# Patient Record
Sex: FEMALE | Race: WHITE | URBAN - METROPOLITAN AREA
[De-identification: names, ages, dates, MRNs, and addresses within clinical notes are randomized per-mention and may not be internally consistent; named-entity substitution may affect disease eponyms.]

---

## 2018-08-06 ENCOUNTER — HISTORICAL (OUTPATIENT)
Dept: ADMINISTRATIVE | Facility: HOSPITAL | Age: 83
End: 2018-08-06

## 2018-08-20 ENCOUNTER — HISTORICAL (OUTPATIENT)
Dept: ADMINISTRATIVE | Facility: HOSPITAL | Age: 83
End: 2018-08-20

## 2018-09-19 ENCOUNTER — HISTORICAL (OUTPATIENT)
Dept: ADMINISTRATIVE | Facility: HOSPITAL | Age: 83
End: 2018-09-19

## 2019-03-21 ENCOUNTER — HISTORICAL (OUTPATIENT)
Dept: ADMINISTRATIVE | Facility: HOSPITAL | Age: 84
End: 2019-03-21

## 2019-06-11 ENCOUNTER — HISTORICAL (OUTPATIENT)
Dept: ADMINISTRATIVE | Facility: HOSPITAL | Age: 84
End: 2019-06-11

## 2019-06-27 ENCOUNTER — HISTORICAL (OUTPATIENT)
Dept: ADMINISTRATIVE | Facility: HOSPITAL | Age: 84
End: 2019-06-27

## 2019-07-23 ENCOUNTER — HISTORICAL (OUTPATIENT)
Dept: ADMINISTRATIVE | Facility: HOSPITAL | Age: 84
End: 2019-07-23
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
## 2022-05-02 NOTE — HISTORICAL [facility]
This is a historical note converted from [employer]. Formatting and pictures may have been removed.  Please reference [employer] for original formatting and attached multimedia. Chief Complaint  6 WEEK FOLLOW UP RIGHT TIBIA PLATEAU FX, NON-OP, IN HKB.  History of Present Illness  Patient is?6 weeks?s/p Right medial tibial plateau fracture treated as non-op. Pt is doing well overall.  Review of Systems  Constitutional: negative except as stated in HPI  Eye: negative except as stated in HPI  ENMT: negative except as stated in HPI  Respiratory: negative except as stated in HPI  Cardiovascular: negative except as stated in HPI  Gastrointestinal: negative except as stated in HPI  Genitourinary: negative except as stated in HPI  Hema/Lymph: negative except as stated in HPI  Endocrine: negative except as stated in HPI  Immunologic: negative except as stated in HPI  Musculoskeletal: negative except as stated in HPI  Integumentary: negative except as stated in HPI  Neurologic: negative except as stated in HPI  ?   All Other ROS_ ?negative except as stated in HPI  Physical Exam  Vitals & Measurements  HR:?80(Peripheral)? RR:?20? BP:?132/76?  HT:?162?cm? HT:?162?cm? WT:?76?kg? WT:?76?kg? BMI:?28.96?  General-Alert, oriented, no fever, chills  HEENT-Normocephalic, EOMI, moist oral mucosa, neck supple and nontender  Respiratory-Nonlabored respirations, symmetric chest expansion, chest wall nontender  Cardiac-Regular rate and rhythm, Pulses palpable in all extremities, Normal peripheral perfusion  Gastrointestinal-Soft, nontender, nondistended  Hemo/Lymph-No lymphadenopathy  Musculoskeletal-RLE-mild swelling to right knee. +TA/GS, EHL/FHL intact. SILT distally.  ?Neurologic-Alert and oriented x4, cooperative  ?Dermatologic-Skin warm, pink, dry.  ?  Assessment/Plan  Displaced fracture of medial condyle of right tibia, initial encounter for closed fracture  Ordered:  Clinic Follow up, *Est. 09/20/18 3:00:00 CDT, Order for future visit,  Displaced fracture of medial condyle of right tibia, initial encounter for closed fracture, [facility]  Post-Op follow-up visit [id number] PC, Displaced fracture of medial condyle of right tibia, initial encounter for closed fracture, [facility], 08/20/18 9:53:00 CDT  PT/OT External Referral, 08/20/18 9:53:00 CDT, Displaced fracture of medial condyle of right tibia, initial encounter for closed fracture, Evaluate and Treat, 3 X Week, NWB RLE. Gental ROM right knee 0-90 degrees. All modalaties welcomed.  ?  ?  ?  COntinue NWB RLE. Continue current treatment with hinged knee locked in extension; ok to come out of knee brace several times a day for ROM exercises and hygiene. Work on bending knee several times a day. PT orders for gentle ROM right knee provided. RTC in 1 month for X-rays.   Problem List/Past Medical History  Ongoing  Anxiety disorder  Hyperlipidaemia  Hypertension  Hypothyroidism  Historical  No qualifying data  Medications  cyclobenzaprine 10 mg oral tablet, 10 mg= 1 tab(s), Oral, Daily  Flonase 50 mcg/inh nasal spray, 1 spray(s), Nasal, BID  ibuprofen 400 mg oral tablet, 400 mg= 1 tab(s), Oral, q4hr, PRN  Lexapro 20 mg oral tablet, 20 mg= 1 tab(s), Oral, Daily  metoprolol tartrate 50 mg oral tab, 50 mg= 1 tab(s), Oral, BID  Pravastatin 80 mg Oral Tab, 80 mg= 1 tab(s), Oral, Daily  Synthroid 100 mcg (0.1 mg) oral tablet, 100 mcg= 1 tab(s), Oral, Daily  traZODone, 25 mg, Oral  Allergies  codeine?(RASH)  Social History  Tobacco  Never smoker Use:., 08/06/2018  Family History  Family history is negative  Health Maintenance  Health Maintenance  ???Pending?(in the next year)  ??? ??Due?  ??? ? ? ?Bone Density Screening due??08/20/18??Variable frequency  ??? ? ? ?Fall Risk Assessment due??08/20/18??and every 1??year(s)  ??? ? ? ?Functional Assessment due??08/20/18??and every 1??year(s)  ??? ? ? ?Hypertension Management-Education due??08/20/18??and every 1??year(s)  ??? ? ? ?Hypertension  Management-BMP due??08/20/18??and every?  ??? ? ? ?Pneumococcal Vaccine due??08/20/18??and every 100??year(s)  ??? ? ? ?Pneumococcal Vaccine due??08/20/18??and every?  ??? ? ? ?Smoking Cessation due??08/20/18??and every 1??year(s)  ??? ? ? ?Tetanus Vaccine due??08/20/18??and every 10??year(s)  ??? ? ? ?Zoster Vaccine due??08/20/18??and every 100??year(s)  ??? ??Due In Future?  ??? ? ? ?Hypertension Management-Blood Pressure not due until??08/06/19??and every 1??year(s)  ???Satisfied?(in the past 1 year)  ??? ??Satisfied?  ??? ? ? ?Blood Pressure Screening on??08/20/18.??Satisfied by Marychuy Alves  ??? ? ? ?Body Mass Index Check on??08/20/18.??Satisfied by Marychuy Alves  ??? ? ? ?Depression Screening on??08/20/18.??Satisfied by Marychuy Alves  ??? ? ? ?Hypertension Management-Blood Pressure on??08/20/18.??Satisfied by Marychuy Alves  ??? ? ? ?Obesity Screening on??08/20/18.??Satisfied by Marychuy Alves  ?  ?  Diagnostic Results  X-ray right knee shows evidence of healing fracture, alignment unchanged from previous.      Patient seen and examined  Agree with above

## 2022-05-02 NOTE — HISTORICAL [facility]
This is a historical note converted from [employer]. Formatting and pictures may have been removed.  Please reference [employer] for original formatting and attached multimedia. Chief Complaint  10.5 week follow up right tibial plateau fx, non-op. In wheelchair, no complaints  History of Present Illness  Patient is?10.5 weeks?s/p Right medial tibial plateau fracture treated as non-op. Here today for x-ray and evaluation.?Pt is doing well overall.  Review of Systems  Constitutional: negative except as stated in HPI  Eye: negative except as stated in HPI  ENMT: negative except as stated in HPI  Respiratory: negative except as stated in HPI  Cardiovascular: negative except as stated in HPI  Gastrointestinal: negative except as stated in HPI  Genitourinary: negative except as stated in HPI  Hema/Lymph: negative except as stated in HPI  Endocrine: negative except as stated in HPI  Immunologic: negative except as stated in HPI  Musculoskeletal: negative except as stated in HPI  Integumentary: negative except as stated in HPI  Neurologic: negative except as stated in HPI  ?   All Other ROS_ ?negative except as stated in HPI  Physical Exam  Vitals & Measurements  HR:?80(Peripheral)? RR:?20? BP:?132/76?  HT:?162?cm? HT:?162?cm? WT:?76?kg? WT:?76?kg? BMI:?28.96?  General-Alert, oriented, no fever, chills  HEENT-Normocephalic, EOMI, moist oral mucosa, neck supple and nontender  Respiratory-Nonlabored respirations, symmetric chest expansion, chest wall nontender  Cardiac-Regular rate and rhythm, Pulses palpable in all extremities, Normal peripheral perfusion  Gastrointestinal-Soft, nontender, nondistended  Hemo/Lymph-No lymphadenopathy  Musculoskeletal-RLE-mild swelling to right knee. +TA/GS, EHL/FHL intact. SILT distally.  ?Neurologic-Alert and oriented x4, cooperative  ?Dermatologic-Skin warm, pink, dry.  ?  Assessment/Plan  Displaced fracture of medial condyle of right tibia, initial encounter for closed fracture  Ordered:  Clinic  Follow up, *Est. 12/19/18 3:00:00 CST, Order for future visit, Displaced fracture of medial condyle of right tibia, initial encounter for closed fracture, [facility]  Post-Op follow-up visit [id number] PC, Displaced fracture of medial condyle of right tibia, initial encounter for closed fracture, [facility], 09/19/18 9:16:00 CDT  PT/OT External Referral, 09/19/18 9:09:00 CDT, Displaced fracture of medial condyle of right tibia, initial encounter for closed fracture, Evaluate and Treat, 3 X Week, Advance to FWBAT with walker RLE. Strengthening, stretching, and gait training RLE. All modalaties welcomed.  ?  ?  ?  Patient doing well. Advance to FWBAT RLE with walker. No restrictions. PT order provided for ROM, strengthening, stretching and gait training. Explained to patient the next step in her care will be a knee replacement. OTC ibuprofen or Tylenol as needed for pain. RTC in 3 months.   Problem List/Past Medical History  Ongoing  Anxiety disorder  Hyperlipidaemia  Hypertension  Hypothyroidism  Historical  No qualifying data  Medications  cyclobenzaprine 10 mg oral tablet, 10 mg= 1 tab(s), Oral, Daily  Flonase 50 mcg/inh nasal spray, 1 spray(s), Nasal, BID  ibuprofen 400 mg oral tablet, 400 mg= 1 tab(s), Oral, q4hr, PRN  Lexapro 20 mg oral tablet, 20 mg= 1 tab(s), Oral, Daily  metoprolol tartrate 50 mg oral tab, 50 mg= 1 tab(s), Oral, BID  Pravastatin 80 mg Oral Tab, 80 mg= 1 tab(s), Oral, Daily  Synthroid 100 mcg (0.1 mg) oral tablet, 100 mcg= 1 tab(s), Oral, Daily  traZODone, 25 mg, Oral  Allergies  codeine?(RASH)  Social History  Tobacco  Never smoker Use:., 08/06/2018  Family History  Family history is negative  Health Maintenance  Health Maintenance  ???Pending?(in the next year)  ??? ??Due?  ??? ? ? ?ADL Screening due??09/19/18??and every 1??year(s)  ??? ? ? ?Advance Directive due??09/19/18??and every 1??year(s)  ??? ? ? ?Bone Density Screening due??09/19/18??Variable frequency  ??? ? ?  ?Cognitive Screening due??09/19/18??and every 1??year(s)  ??? ? ? ?Fall Risk Assessment due??09/19/18??and every 1??year(s)  ??? ? ? ?Functional Assessment due??09/19/18??and every 1??year(s)  ??? ? ? ?Geriatric Depression Screening due??09/19/18??and every 1??year(s)  ??? ? ? ?Hypertension Management-Education due??09/19/18??and every 1??year(s)  ??? ? ? ?Hypertension Management-BMP due??09/19/18??and every?  ??? ? ? ?Pneumococcal Vaccine due??09/19/18??Variable frequency  ??? ? ? ?Pneumococcal Vaccine due??09/19/18??and every?  ??? ? ? ?Smoking Cessation due??09/19/18??Variable frequency  ??? ? ? ?Tetanus Vaccine due??09/19/18??and every 10??year(s)  ??? ? ? ?Zoster Vaccine due??09/19/18??and every 100??year(s)  ???Satisfied?(in the past 1 year)  ??? ??Satisfied?  ??? ? ? ?Blood Pressure Screening on??09/19/18.??Satisfied by Marychuy Alves  ??? ? ? ?Body Mass Index Check on??09/19/18.??Satisfied by Marychuy Alves  ??? ? ? ?Depression Screening on??09/19/18.??Satisfied by Marychuy Alves  ??? ? ? ?Hypertension Management-Blood Pressure on??09/19/18.??Satisfied by Marychuy Alves  ??? ? ? ?Obesity Screening on??09/19/18.??Satisfied by Marychuy Alves  ?  ?  Diagnostic Results  X-ray right knee shows evidence of healing fracture, alignment unchanged from previous.      Patient seen and examined  Agree with above

## 2022-05-02 NOTE — HISTORICAL [facility]
This is a historical note converted from Be[employer]. Formatting and pictures may have been removed.  Please reference Berenice for original formatting and attached multimedia. Chief Complaint  2 MONTH F/U RIGHT KNEPAIN.  History of Present Illness  86-year-old female returns in follow-up of right knee osteoarthritis. ?Patient got excellent relief of her recent injection. ?She is happy with her relief from the right?knee. ?Her main complaint is actually her left hand today. ?Notes triggering to her left ring finger as well as pain in her palm. ?Worse with flexion. ?Bothers her at night. ?Denies radiations.  Review of Systems  Denies fevers, chills, chest pain, shortness of breath. Comprehensive review of systems performed and otherwise negative except as noted in HPI.  Physical Exam  Vitals & Measurements  BP:?130/78?  HT:?162?cm? WT:?76?kg? BMI:?28.96?  General: No acute distress, alert and oriented, healthy appearing?  HEENT: Head is atraumatic, mucous membranes are moist  Neck: Supples, no JVD  Cardiovascular: Palpable dorsalis pedis and posterior tibial pulses, regular rate and rhythm to those pulses  Lungs: Breathing non-labored  Skin: no rashes appreciated  Neurologic: Can flex and extend knees, ankles, and toes. Sensation is grossly intact  ?  Focused Orthopedic Exam:?  Right knee without wound or skin breakdown.  Mild 1+ joint effusion  tenderness to palpation about the medial joint line  ROM from 5 extension to 110 flexion  stable to varus/valgus. stable to anterior/posterior drawer  Mildly positive McMurrays  Positive patellofemoral crepitus to ROM  ?   Left hand:  Tenderness palpation A1 pulley of the left ring finger.? Mild catching. ?No active triggering however.  ?  Assessment/Plan  1.?Primary osteoarthritis of right knee?M17.11  ?Patients right knee osteoarthritis. ?She has responded well to injection in the past. ?She like to hold off any further intervention. ?She is due for another injection?at any point  in the near future.? She will contact us if she wishes a knee injection in the future.  Ordered:  Clinic Follow up, *Est. 05/21/19 3:00:00 CDT, Order for future visit, Primary osteoarthritis of right knee  Trigger finger, left ring finger, Orthopaedics  Office/Outpatient Visit Level 3 Established [id number] , Primary osteoarthritis of right knee  Trigger finger, left ring finger, [facility], 03/21/19 9:05:00 CDT  ?  2.?Trigger finger, left ring finger?M65.342  ?Patient?with?left ring finger trigger finger.? We will give her an injection to try to calm this down.  ?  After verbal consent obtained the patients left?ring finger was prepped over the A1 pulley and injected with 1 cc of 1% lidocaine and 6 mg betamethasone  Ordered:  betamethasone, 6 mg, Intra-Articular, Once, first dose 03/21/19 10:00:00 CDT, stop date 03/21/19 10:00:00 CDT  Lidocaine inj., 1 mL, Intra-Articular, Once, first dose 03/21/19 9:05:00 CDT, stop date 03/21/19 9:05:00 CDT  asp/inj small joint/bursa 01920 , 03/21/19 9:05:00 CDT, [facility], Routine, 03/21/19 9:05:00 CDT  Clinic Follow up, *Est. 05/21/19 3:00:00 CDT, Order for future visit, Primary osteoarthritis of right knee  Trigger finger, left ring finger, Orthopaedics  Office/Outpatient Visit Level 3 Established [id number] , Primary osteoarthritis of right knee  Trigger finger, left ring finger, [facility], 03/21/19 9:05:00 CDT  ?   Problem List/Past Medical History  Ongoing  Anxiety disorder  Hyperlipidaemia  Hypertension  Hypothyroidism  Historical  No qualifying data  Procedure/Surgical History  Appendectomy  BILATERAL HIP FRACTURES  Cholecystectomy  Hysterectomy   Medications  alendronate 70 mg oral tablet  celecoxib 200 mg oral capsule  Celestone, 6 mg, Intra-Articular, Once  cyclobenzaprine 10 mg oral tablet, 10 mg= 1 tab(s), Oral, Daily,? ?Unable to obtain: Last Dose Date/Time Unknown  Flonase 50 mcg/inh nasal spray, 1 spray(s), Nasal,  BID  ibuprofen 400 mg oral tablet, 400 mg= 1 tab(s), Oral, q4hr, PRN  Lexapro 20 mg oral tablet, 20 mg= 1 tab(s), Oral, Daily  lidocaine 2% injectable solution, 1 mL, Intra-Articular, Once  metoprolol tartrate 50 mg oral tab, 50 mg= 1 tab(s), Oral, BID  Naprosyn 500 mg oral tablet, 500 mg= 1 tab(s), Oral, BID, PRN  Pravastatin 80 mg Oral Tab, 80 mg= 1 tab(s), Oral, Daily  Synthroid 100 mcg (0.1 mg) oral tablet, 100 mcg= 1 tab(s), Oral, Daily  traZODone, 25 mg, Oral,? ?Not Taking, Completed Rx: SHE RAN OUT Last Dose Date/Time Unknown  Allergies  codeine?(RASH)  Social History  Alcohol  Never, 12/11/2018  Home/Environment  Lives with Alone., 12/11/2018  Tobacco  Former smoker, quit more than 30 days ago, N/A, 03/21/2019  Never (less than 100 in lifetime), No, 01/22/2019  Former smoker, No, 12/11/2018  Never smoker Use:., 08/06/2018  Family History  Family history is negative  Health Maintenance  Health Maintenance  ???Pending?(in the next year)  ??? ??Due?  ??? ? ? ?ADL Screening due??03/21/19??and every 1??year(s)  ??? ? ? ?Advance Directive due??03/21/19??and every 1??year(s)  ??? ? ? ?Bone Density Screening due??03/21/19??Variable frequency  ??? ? ? ?Cognitive Screening due??03/21/19??and every 1??year(s)  ??? ? ? ?Fall Risk Assessment due??03/21/19??and every 1??year(s)  ??? ? ? ?Functional Assessment due??03/21/19??and every 1??year(s)  ??? ? ? ?Geriatric Depression Screening due??03/21/19??and every 1??year(s)  ??? ? ? ?Hypertension Management-Education due??03/21/19??and every 1??year(s)  ??? ? ? ?Hypertension Management-BMP due??03/21/19??and every?  ??? ? ? ?Pneumococcal Vaccine due??03/21/19??Variable frequency  ??? ? ? ?Pneumococcal Vaccine due??03/21/19??and every?  ??? ? ? ?Smoking Cessation due??03/21/19??Variable frequency  ??? ? ? ?Tetanus Vaccine due??03/21/19??and every 10??year(s)  ??? ? ? ?Zoster Vaccine due??03/21/19??and every 100??year(s)  ??? ??Due In Future?  ??? ? ? ?Hypertension  Management-Blood Pressure not due until??01/22/20??and every 1??year(s)  ???Satisfied?(in the past 1 year)  ??? ??Satisfied?  ??? ? ? ?Blood Pressure Screening on??03/21/19.??Satisfied by Macy Hurtado  ??? ? ? ?Body Mass Index Check on??03/21/19.??Satisfied by Macy Hurtado  ??? ? ? ?Depression Screening on??12/11/18.??Satisfied by Macy Hurtado  ??? ? ? ?Hypertension Management-Blood Pressure on??03/21/19.??Satisfied by Macy Hurtado  ??? ? ? ?Obesity Screening on??03/21/19.??Satisfied by Macy Hurtado  ?  ?  Diagnostic Results  AP lateral right knee reviewed. ?Patient with significant joint space narrowing the medial joint line.
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
## 2022-05-03 NOTE — HISTORICAL [facility] CERNER
This is a historical note converted from [facility]. Formatting and pictures may have been removed.  Please reference [place] for original formatting and attached multimedia. Chief Complaint  c/o right knee movement and pain when walking, states feels like it wants to give out, wearing knee brace, xrays today....sm  History of Present Illness  86-year-old M presents here for follow-up of medial tibial plateau fracture on the right side. ?This treated nonoperatively. ?Patient is doing very well. ?She is ambulating with a walker.? She is working physical therapy and noticed a popping to the medial aspect of her?right knee. ?She is here today to work-up that. ?It does cause her pain when it does pop.  Review of Systems  Denies fevers, chills, chest pain, shortness of breath. Comprehensive review of systems performed and otherwise negative except as noted in HPI.  Physical Exam  Vitals & Measurements  HR:?84(Peripheral)? BP:?138/72?  HT:?162?cm? WT:?76?kg? BMI:?28.96?  General: No acute distress, alert and oriented, healthy appearing?  HEENT: Head is atraumatic, mucous membranes are moist  Neck: Supples, no JVD  Cardiovascular: Palpable dorsalis pedis and posterior tibial pulses, regular rate and rhythm to those pulses  Lungs: Breathing non-labored  Skin: no rashes appreciated  Neurologic: Can flex and extend knees, ankles, and toes. Sensation is grossly intact  ?  Right knee:  Patient with excellent range of motion of the right knee.? From 0-110.? Does have a popping?for what appears to be her medial hamstring around her medial condyle. ?It does cause some mild pain. ?Is stabilized?with?manual pressure.? Sensation intact distally. ?No tenderness?about the?right knee.  Assessment/Plan  1.?Fracture of right tibial plateau?S82.141A  ?Patient?posttraumatic arthritis the right tibia and tibial plateau. ?We will try an injection to calm this down.  ?  After verbal consent was obtained the patients right knee was prepped with  Betadine and anesthetized with ethyl chloride. The right knee joint was then injected under sterile technique with 2 mL of 2% lidocaine and betamethasone it was dressed with a Band-Aid. The patient received good relief from the injection and was able to ambulate normally from clinic.  Ordered:  betamethasone, 12 mg, Intra-Articular, Once, first dose 07/23/19 15:00:00 CDT, stop date 07/23/19 15:00:00 CDT  Lidocaine inj., 2 mL, Intra-Articular, Once, first dose 07/23/19 14:39:00 CDT, stop date 07/23/19 14:39:00 CDT  Clinic Follow up, *Est. 09/03/19 3:00:00 CDT, Order for future visit, Fracture of right tibial plateau  Post-traumatic osteoarthritis of right knee, Orthopaedics  Office/Outpatient Visit Level 3 Established [id number] PC, Fracture of right tibial plateau  Post-traumatic osteoarthritis of right knee, [facility], 07/23/19 14:39:00 CDT  XR Knee Right 1 or 2 Views, Routine, 07/23/19 14:17:00 CDT, Pain, None, Ambulatory, S82.141A, Not Scheduled, 07/23/19 14:17:00 CDT  ?  2.?Post-traumatic osteoarthritis of right knee?M17.31  Ordered:  betamethasone, 12 mg, Intra-Articular, Once, first dose 07/23/19 15:00:00 CDT, stop date 07/23/19 15:00:00 CDT  Lidocaine inj., 2 mL, Intra-Articular, Once, first dose 07/23/19 14:39:00 CDT, stop date 07/23/19 14:39:00 CDT  Clinic Follow up, *Est. 09/03/19 3:00:00 CDT, Order for future visit, Fracture of right tibial plateau  Post-traumatic osteoarthritis of right knee, Orthopaedics  Office/Outpatient Visit Level 3 Established [id number] PC, Fracture of right tibial plateau  [facility], 07/23/19 14:39:00 CDT  ?  Orders:  asp/inj jnt/bursa, major 09240 PC, 07/23/19 14:39:00 CDT, Orthopaedics Clinic, Routine, 07/23/19 14:39:00 CDT  Referrals  Clinic Follow up, *Est. 09/03/19 3:00:00 CDT, Order for future visit, Fracture of right tibial plateau  Post-traumatic osteoarthritis of right knee, [facility]   Problem  List/Past Medical History  Ongoing  Anxiety disorder  Displaced fracture of medial condyle of right tibia, initial encounter for closed fracture  Fracture of right tibial plateau  Hyperlipidaemia  Hypertension  Hypothyroidism  Historical  No qualifying data  Procedure/Surgical History  Appendectomy  BILATERAL HIP FRACTURES  Cholecystectomy  Hysterectomy   Medications  acetaminophen-hydrocodone 325 mg-5 mg oral tablet, 1 tab(s), Oral, BID  alendronate 70 mg oral tablet  celecoxib 200 mg oral capsule,? ?Not taking: Last Dose Date/Time Unknown. DONE  Celestone, 12 mg, Intra-Articular, Once  cyclobenzaprine 10 mg oral tablet, 10 mg= 1 tab(s), Oral, Daily,? ?Unable to obtain: Last Dose Date/Time Unknown  Flonase 50 mcg/inh nasal spray, 1 spray(s), Nasal, BID  ibuprofen 400 mg oral tablet, 400 mg= 1 tab(s), Oral, q4hr, PRN  Lexapro 20 mg oral tablet, 20 mg= 1 tab(s), Oral, Daily  lidocaine 2% injectable solution, 2 mL, Intra-Articular, Once  metoprolol tartrate 50 mg oral tab, 50 mg= 1 tab(s), Oral, BID  Pravastatin 80 mg Oral Tab, 80 mg= 1 tab(s), Oral, Daily  Synthroid 100 mcg (0.1 mg) oral tablet, 100 mcg= 1 tab(s), Oral, Daily  traZODone, 25 mg, Oral  Allergies  codeine?(RASH)  Social History  Abuse/Neglect  No, 06/27/2019  Alcohol  Never, 12/11/2018  Home/Environment  Lives with Alone., 12/11/2018  Tobacco  Former smoker, quit more than 30 days ago, N/A, 07/23/2019  Family History  Family history is negative  Health Maintenance  Health Maintenance  ???Pending?(in the next year)  ??? ??OverDue  ??? ? ? ?Advance Directive due??01/01/19??and every 1??year(s)  ??? ? ? ?Cognitive Screening due??01/01/19??and every 1??year(s)  ??? ? ? ?Fall Risk Assessment due??01/01/19??and every 1??year(s)  ??? ? ? ?Functional Assessment due??01/01/19??and every 1??year(s)  ??? ? ? ?Geriatric Depression Screening due??01/01/19??and every 1??year(s)  ??? ??Due?  ??? ? ? ?ADL Screening due??07/23/19??and every 1??year(s)  ??? ? ? ?Bone  Density Screening due??07/23/19??Variable frequency  ??? ? ? ?Hypertension Management-Education due??07/23/19??and every 1??year(s)  ??? ? ? ?Hypertension Management-BMP due??07/23/19??and every?  ??? ? ? ?Pneumococcal Vaccine due??07/23/19??Variable frequency  ??? ? ? ?Pneumococcal Vaccine due??07/23/19??and every?  ??? ? ? ?Tetanus Vaccine due??07/23/19??and every 10??year(s)  ??? ? ? ?Zoster Vaccine due??07/23/19??and every 100??year(s)  ??? ??Due In Future?  ??? ? ? ?Obesity Screening not due until??01/01/20??and every 1??year(s)  ??? ? ? ?Hypertension Management-Blood Pressure not due until??06/26/20??and every 1??year(s)  ???Satisfied?(in the past 1 year)  ??? ??Satisfied?  ??? ? ? ?Blood Pressure Screening on??07/23/19.??Satisfied by Dinorah Ma LPN  ??? ? ? ?Body Mass Index Check on??07/23/19.??Satisfied by Dinorah Ma LPN  ??? ? ? ?Depression Screening on??12/11/18.??Satisfied by Macy Hurtado  ??? ? ? ?Hypertension Management-Blood Pressure on??07/23/19.??Satisfied by Dinorah Ma LPN  ??? ? ? ?Obesity Screening on??07/23/19.??Satisfied by Dinorah Ma LPN  ?  Diagnostic Results  AP lateral right knee reviewed. ?Patients fracture well aligned. ?No significant displacement noted. ?She has end-stage posttraumatic arthritis of the right knee.